# Patient Record
Sex: MALE | Race: WHITE | NOT HISPANIC OR LATINO | Employment: FULL TIME | URBAN - METROPOLITAN AREA
[De-identification: names, ages, dates, MRNs, and addresses within clinical notes are randomized per-mention and may not be internally consistent; named-entity substitution may affect disease eponyms.]

---

## 2019-03-10 ENCOUNTER — OFFICE VISIT (OUTPATIENT)
Dept: URGENT CARE | Facility: CLINIC | Age: 24
End: 2019-03-10

## 2019-03-10 VITALS
HEIGHT: 73 IN | DIASTOLIC BLOOD PRESSURE: 86 MMHG | TEMPERATURE: 98.4 F | HEART RATE: 74 BPM | OXYGEN SATURATION: 98 % | RESPIRATION RATE: 16 BRPM | BODY MASS INDEX: 25.98 KG/M2 | WEIGHT: 196 LBS | SYSTOLIC BLOOD PRESSURE: 124 MMHG

## 2019-03-10 DIAGNOSIS — K04.7 DENTAL ABSCESS: ICD-10-CM

## 2019-03-10 PROCEDURE — 99203 OFFICE O/P NEW LOW 30 MIN: CPT | Performed by: PHYSICIAN ASSISTANT

## 2019-03-10 RX ORDER — CLINDAMYCIN HYDROCHLORIDE 150 MG/1
450 CAPSULE ORAL 3 TIMES DAILY
Qty: 90 CAP | Refills: 0 | Status: SHIPPED | OUTPATIENT
Start: 2019-03-10 | End: 2019-03-20

## 2019-03-10 ASSESSMENT — ENCOUNTER SYMPTOMS
FEVER: 0
HEADACHES: 0
DIARRHEA: 0
BLURRED VISION: 0
CHILLS: 0
NAUSEA: 0
SORE THROAT: 0
VOMITING: 0
DOUBLE VISION: 0

## 2019-03-10 NOTE — PROGRESS NOTES
"Subjective:   Miki Walsh is a 23 y.o. male who presents for Jaw Pain (x3 days, started as pain in the tooth moved to the jaw, left side)      This is a new problem.  Patient presents to urgent care with facial pain and swelling on the left for the past 3 days, worsening.  Patient reports that he had pain in an upper molar preceding the onset of swelling of the left side of the face.  The swelling has progressively worsened over the course of the past 3 days and today he has noted some numbness of the left side of the face.  The patient denies any fever, chills, myalgias or arthralgias.  Denies difficulty swallowing.     HPI  Review of Systems   Constitutional: Negative for chills, fever and malaise/fatigue.   HENT: Negative for congestion, ear pain and sore throat.         Oral dental pain   Eyes: Negative for blurred vision and double vision.   Gastrointestinal: Negative for diarrhea, nausea and vomiting.   Neurological: Negative for headaches.   All other systems reviewed and are negative.    No Known Allergies     Objective:   /86   Pulse 74   Temp 36.9 °C (98.4 °F)   Resp 16   Ht 1.854 m (6' 1\")   Wt 88.9 kg (196 lb)   SpO2 98%   BMI 25.86 kg/m²   Physical Exam   Constitutional: He is oriented to person, place, and time. He appears well-developed and well-nourished.   HENT:   Head: Normocephalic and atraumatic.       Right Ear: Tympanic membrane, external ear and ear canal normal.   Left Ear: Tympanic membrane, external ear and ear canal normal.   Nose: Nose normal.   Mouth/Throat: Uvula is midline, oropharynx is clear and moist and mucous membranes are normal. No trismus in the jaw. Dental abscesses and dental caries present. No oropharyngeal exudate. Tonsils are 1+ on the right. Tonsils are 1+ on the left. No tonsillar exudate.       Left side of face with soft tissue swelling with mild erythema, nontender, not fluctuant   Eyes: Pupils are equal, round, and reactive to light. Conjunctivae, EOM " and lids are normal.   Neck: Normal range of motion. Neck supple.   Cardiovascular: Normal rate, regular rhythm and normal heart sounds.  Exam reveals no friction rub.    No murmur heard.  Pulmonary/Chest: Effort normal and breath sounds normal. No respiratory distress.   Musculoskeletal: Normal range of motion.   Lymphadenopathy:        Head (right side): No submental, no submandibular, no tonsillar, no preauricular and no posterior auricular adenopathy present.        Head (left side): No submental, no submandibular, no tonsillar, no preauricular and no posterior auricular adenopathy present.     He has no cervical adenopathy.        Right: No supraclavicular adenopathy present.        Left: No supraclavicular adenopathy present.   Neurological: He is alert and oriented to person, place, and time. He has normal strength. No cranial nerve deficit or sensory deficit. Coordination normal.   Skin: Skin is warm and dry. No rash noted.   Psychiatric: He has a normal mood and affect. Judgment normal.   Vitals reviewed.         Assessment/Plan:   Assessment    1. Dental abscess  - clindamycin (CLEOCIN) 150 MG Cap; Take 3 Caps by mouth 3 times a day for 10 days.  Dispense: 90 Cap; Refill: 0  - REFERRAL TO DENTISTRY    Patient will be treated with clindamycin 450 mg 3 times daily.  Urgent referral placed to dentistry.  Patient is counseled on the importance of seeing dental as soon as possible.  I have counseled the patient on recommendation to present to the emergency department for any worsening of symptoms.  The patient verbalizes understanding and is agreeable with the plan.    Differential diagnosis, natural history, supportive care, and indications for immediate follow-up discussed.      If not improving in 3-5 days, F/U with PCP or return to  or sooner if worsens  Red flag warning symptoms and strict ER precautions given.    Please note that this note was created using voice recognition speech to text software.  Every effort has been made to correct obvious errors.  However, I expect there are errors of grammar and possibly context that were not discovered prior to finalizing the note

## 2024-08-17 ENCOUNTER — HOSPITAL ENCOUNTER (EMERGENCY)
Facility: MEDICAL CENTER | Age: 29
End: 2024-08-17
Attending: STUDENT IN AN ORGANIZED HEALTH CARE EDUCATION/TRAINING PROGRAM

## 2024-08-17 ENCOUNTER — APPOINTMENT (OUTPATIENT)
Dept: RADIOLOGY | Facility: MEDICAL CENTER | Age: 29
End: 2024-08-17
Attending: STUDENT IN AN ORGANIZED HEALTH CARE EDUCATION/TRAINING PROGRAM

## 2024-08-17 VITALS
TEMPERATURE: 97 F | RESPIRATION RATE: 18 BRPM | SYSTOLIC BLOOD PRESSURE: 122 MMHG | BODY MASS INDEX: 24.25 KG/M2 | DIASTOLIC BLOOD PRESSURE: 63 MMHG | WEIGHT: 182.98 LBS | HEIGHT: 73 IN | OXYGEN SATURATION: 95 % | HEART RATE: 87 BPM
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
## 2024-08-17 DIAGNOSIS — S41.112A ARM LACERATION, LEFT, INITIAL ENCOUNTER: ICD-10-CM

## 2024-08-17 PROCEDURE — 90715 TDAP VACCINE 7 YRS/> IM: CPT | Performed by: STUDENT IN AN ORGANIZED HEALTH CARE EDUCATION/TRAINING PROGRAM

## 2024-08-17 PROCEDURE — 304999 HCHG REPAIR-SIMPLE/INTERMED LEVEL 1

## 2024-08-17 PROCEDURE — 700111 HCHG RX REV CODE 636 W/ 250 OVERRIDE (IP): Performed by: STUDENT IN AN ORGANIZED HEALTH CARE EDUCATION/TRAINING PROGRAM

## 2024-08-17 PROCEDURE — 304217 HCHG IRRIGATION SYSTEM

## 2024-08-17 PROCEDURE — 700101 HCHG RX REV CODE 250: Performed by: STUDENT IN AN ORGANIZED HEALTH CARE EDUCATION/TRAINING PROGRAM

## 2024-08-17 PROCEDURE — 303747 HCHG EXTRA SUTURE

## 2024-08-17 PROCEDURE — 73090 X-RAY EXAM OF FOREARM: CPT | Mod: LT

## 2024-08-17 PROCEDURE — 99284 EMERGENCY DEPT VISIT MOD MDM: CPT

## 2024-08-17 PROCEDURE — 90471 IMMUNIZATION ADMIN: CPT

## 2024-08-17 RX ORDER — LIDOCAINE HYDROCHLORIDE AND EPINEPHRINE 10; 10 MG/ML; UG/ML
10 INJECTION, SOLUTION INFILTRATION; PERINEURAL ONCE
Status: COMPLETED | OUTPATIENT
Start: 2024-08-17 | End: 2024-08-17

## 2024-08-17 RX ADMIN — CLOSTRIDIUM TETANI TOXOID ANTIGEN (FORMALDEHYDE INACTIVATED), CORYNEBACTERIUM DIPHTHERIAE TOXOID ANTIGEN (FORMALDEHYDE INACTIVATED), BORDETELLA PERTUSSIS TOXOID ANTIGEN (GLUTARALDEHYDE INACTIVATED), BORDETELLA PERTUSSIS FILAMENTOUS HEMAGGLUTININ ANTIGEN (FORMALDEHYDE INACTIVATED), BORDETELLA PERTUSSIS PERTACTIN ANTIGEN, AND BORDETELLA PERTUSSIS FIMBRIAE 2/3 ANTIGEN 0.5 ML: 5; 2; 2.5; 5; 3; 5 INJECTION, SUSPENSION INTRAMUSCULAR at 18:25

## 2024-08-17 RX ADMIN — LIDOCAINE HYDROCHLORIDE AND EPINEPHRINE 10 ML: 10; 10 INJECTION, SOLUTION INFILTRATION; PERINEURAL at 18:15

## 2024-08-18 ENCOUNTER — OFFICE VISIT (OUTPATIENT)
Dept: URGENT CARE | Facility: CLINIC | Age: 29
End: 2024-08-18

## 2024-08-18 VITALS
OXYGEN SATURATION: 98 % | BODY MASS INDEX: 24.12 KG/M2 | WEIGHT: 182 LBS | HEIGHT: 73 IN | TEMPERATURE: 98.2 F | RESPIRATION RATE: 18 BRPM | SYSTOLIC BLOOD PRESSURE: 118 MMHG | DIASTOLIC BLOOD PRESSURE: 66 MMHG | HEART RATE: 79 BPM

## 2024-08-18 DIAGNOSIS — S41.112D LACERATION OF LEFT UPPER EXTREMITY, SUBSEQUENT ENCOUNTER: ICD-10-CM

## 2024-08-18 DIAGNOSIS — T14.8XXA ABRASION: ICD-10-CM

## 2024-08-18 PROCEDURE — 99204 OFFICE O/P NEW MOD 45 MIN: CPT | Performed by: NURSE PRACTITIONER

## 2024-08-18 PROCEDURE — 3074F SYST BP LT 130 MM HG: CPT | Performed by: NURSE PRACTITIONER

## 2024-08-18 PROCEDURE — 3078F DIAST BP <80 MM HG: CPT | Performed by: NURSE PRACTITIONER

## 2024-08-18 RX ORDER — CEPHALEXIN 500 MG/1
500 CAPSULE ORAL 3 TIMES DAILY
Qty: 15 CAPSULE | Refills: 0 | Status: SHIPPED | OUTPATIENT
Start: 2024-08-18 | End: 2024-08-23

## 2024-08-18 NOTE — LETTER
August 18, 2024         Patient: Miki Walsh   YOB: 1995   Date of Visit: 8/18/2024           To Whom it May Concern:    Miki Walsh was seen in my clinic on 8/18/2024. He may return to work on 8/21/24.    If you have any questions or concerns, please don't hesitate to call.        Sincerely,           COLIN Matthews.  Electronically Signed
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
## 2024-08-18 NOTE — DISCHARGE INSTRUCTIONS
Your wound was repaired with stitches.    Please leave the current dressing in place for 24 hours. Then begin cleaning the wound with soap and water - do not scrub, but let the soapy water run over it.  Pat the wound dry; do not rub it.  Please do this twice daily and change the dressing after each clean. If the bandage becomes wet or dirty change it  You will need to have a wound check in 10-14 days for suture removal.  Please see your primary care physician in that time window to have your wound evaluated to ensure it is healing properly.  If you cannot get an appointment with your primary physician, go to an Urgent Care or return to an Emergency Department for this. Do not soak/immerse the wound for at least 4-6 weeks - this means no hot tubs, pools, baths, or swimming until the wound has completely healed.    Scar formation is inevitable, but one of the greatest risks for scar formation is severe sun exposure or sunburns to the wounded area. For the next 9 months, please use hats, sunscreen, or other clothing to try and minimize the sun exposure to the wound. Some wounds can benefit from scar revision surgery, so please followup with your PCP or plastic surgeon as needed.     PAIN MEDICATIONS:   If you were prescribed pain medications, take them only as prescribed.  Do not take extra and do not use another person's pain medications.  Medications with opioids (e.g. Percocet, Vicodin, Norco, oxycodone, Dilaudid, etc..) can make you drowsy or confused.  Do not mix with alcohol.  Do not drive after taking these medications.     Often times, acetaminophen (Tylenol) or ibuprofen (Motrin, Advil, etc...) can be used as a first line pain regimen.  This is often sufficient for most pain associated with your wound.  If you do take these medications, especially Tylenol, make sure you do not mix this with other medications that contain Tylenol (e.g. Percocet).  Make sure you follow the instructions on the bottle to ensure  you do not overdose on these medications.     SEEK MEDICAL CARE IF:    You have redness, swelling, or increasing pain in the wound.  You see a red line that goes away from the wound.  You have yellowish-white fluid (pus) coming from the wound.  You have a fever.  You notice a bad smell coming from the wound or dressing.  Your wound breaks open before or after sutures have been removed.  You notice something coming out of the wound such as wood or glass.  Your wound is on your hand or foot and you cannot move a finger or toe.     SEEK IMMEDIATE MEDICAL CARE IF:    Your pain is not controlled with prescribed medicine.  You have severe swelling around the wound causing pain and numbness or a change in color in your arm, hand, leg, or foot.  Your wound splits open and starts bleeding.  You have worsening numbness, weakness, or loss of function of any joint around or beyond the wound.  You develop painful lumps near the wound or on the skin anywhere on your body.
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
## 2024-08-20 ASSESSMENT — ENCOUNTER SYMPTOMS
FEVER: 0
CHILLS: 0
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
## 2024-08-31 ENCOUNTER — OFFICE VISIT (OUTPATIENT)
Dept: URGENT CARE | Facility: CLINIC | Age: 29
End: 2024-08-31

## 2024-08-31 VITALS
HEIGHT: 73 IN | HEART RATE: 58 BPM | WEIGHT: 190.8 LBS | RESPIRATION RATE: 18 BRPM | DIASTOLIC BLOOD PRESSURE: 44 MMHG | OXYGEN SATURATION: 96 % | TEMPERATURE: 97.9 F | BODY MASS INDEX: 25.29 KG/M2 | SYSTOLIC BLOOD PRESSURE: 110 MMHG

## 2024-08-31 DIAGNOSIS — Z48.02 VISIT FOR SUTURE REMOVAL: ICD-10-CM

## 2024-08-31 NOTE — PROGRESS NOTES
"Subjective:   Miki Walsh is a 29 y.o. male who presents for Suture / Staple Removal (Pt is here for suture removal on (L) forearm, itchiness )      HPI:    Patient presents to urgent care for suture removal  Sutures were placed by ER 14 days ago  Denies pain, swelling, drainage. Wound itches.   Denies fever, chills.      ROS As above in HPI    Medications:    No current outpatient medications on file prior to visit.     No current facility-administered medications on file prior to visit.        Allergies:   Patient has no known allergies.    Problem List:   There is no problem list on file for this patient.       Surgical History:  No past surgical history on file.    Past Social Hx:   Social History     Tobacco Use    Smoking status: Never    Smokeless tobacco: Never   Vaping Use    Vaping status: Never Used   Substance Use Topics    Alcohol use: No    Drug use: No          Problem list, medications, and allergies reviewed by myself today in Epic.     Objective:     /44 (BP Location: Right arm, Patient Position: Sitting, BP Cuff Size: Small adult)   Pulse (!) 58   Temp 36.6 °C (97.9 °F) (Temporal)   Resp 18   Ht 1.854 m (6' 1\")   Wt 86.5 kg (190 lb 12.8 oz)   SpO2 96%   BMI 25.17 kg/m²     Physical Exam  Skin:     Comments: Left Forearm has scattered scabs and crust. There are 7 sutures present. No surrounding erythema, edema, warmth, fluctuance.          Assessment/Plan:       Diagnosis and associated orders:   1. Visit for suture removal        Comments/MDM:       7 sutures were removed in office. Patient tolerated well.   Wound was dressed with polysporin, nonstick pad, secured with ace wrap.  Continue to apply polysporin twice a day and keep wound clean and dry. Cover wound as needed. Sunscreen precaution reviewed.  Follow up with PCP advised       Return to clinic or go to ED if symptoms worsen or persist. Indications for ED discussed at length. Patient/Parent/Guardian voices understanding. " Follow-up with your primary care provider in 3-5 days. Red flag symptoms discussed. All side effects of medication discussed including allergic response, GI upset, tendon injury, rash, sedation etc.    Please note that this dictation was created using voice recognition software. I have made a reasonable attempt to correct obvious errors, but I expect that there are errors of grammar and possibly content that I did not discover before finalizing the note.    This note was electronically signed by HUGH Casey